# Patient Record
Sex: FEMALE | Race: OTHER | Employment: OTHER | URBAN - METROPOLITAN AREA
[De-identification: names, ages, dates, MRNs, and addresses within clinical notes are randomized per-mention and may not be internally consistent; named-entity substitution may affect disease eponyms.]

---

## 2018-07-19 ENCOUNTER — NEW PATIENT COMPREHENSIVE (OUTPATIENT)
Dept: URBAN - METROPOLITAN AREA CLINIC 47 | Facility: CLINIC | Age: 53
End: 2018-07-19

## 2018-07-19 DIAGNOSIS — H52.13: ICD-10-CM

## 2018-07-19 DIAGNOSIS — H52.203: ICD-10-CM

## 2018-07-19 DIAGNOSIS — H52.4: ICD-10-CM

## 2018-07-19 PROCEDURE — 92310PDW PREMIUM SPECIALTY DAILY WEAR

## 2018-07-19 PROCEDURE — 92310-2 LEVEL 2 CONTACT LENS MANAGEMENT

## 2018-07-19 PROCEDURE — 92004 COMPRE OPH EXAM NEW PT 1/>: CPT

## 2018-07-19 PROCEDURE — 92015 DETERMINE REFRACTIVE STATE: CPT

## 2018-07-19 ASSESSMENT — KERATOMETRY
OS_AXISANGLE2_DEGREES: 125
OS_K2POWER_DIOPTERS: 47.37
OS_AXISANGLE_DEGREES: 35
OD_AXISANGLE2_DEGREES: 60
OD_K1POWER_DIOPTERS: 46.12
OD_K2POWER_DIOPTERS: 47.50
OS_K1POWER_DIOPTERS: 46.62
OD_AXISANGLE_DEGREES: 150

## 2018-07-19 ASSESSMENT — VISUAL ACUITY
OU_CC: J1
OD_SC: 20/400
OS_SC: 20/200
OU_CC: 20/30
OD_SC: J3
OS_SC: J1

## 2018-07-19 ASSESSMENT — TONOMETRY
OD_IOP_MMHG: 15
OS_IOP_MMHG: 15

## 2018-09-13 ENCOUNTER — IOP CHECK (OUTPATIENT)
Dept: URBAN - METROPOLITAN AREA CLINIC 47 | Facility: CLINIC | Age: 53
End: 2018-09-13

## 2018-09-13 DIAGNOSIS — H40.013: ICD-10-CM

## 2018-09-13 PROCEDURE — 99213 OFFICE O/P EST LOW 20 MIN: CPT

## 2018-09-13 PROCEDURE — 92083 EXTENDED VISUAL FIELD XM: CPT

## 2018-09-13 ASSESSMENT — KERATOMETRY
OS_K1POWER_DIOPTERS: 46.62
OS_AXISANGLE2_DEGREES: 125
OD_K2POWER_DIOPTERS: 47.50
OD_AXISANGLE2_DEGREES: 60
OS_K2POWER_DIOPTERS: 47.37
OS_AXISANGLE_DEGREES: 35
OD_K1POWER_DIOPTERS: 46.12
OD_AXISANGLE_DEGREES: 150

## 2018-09-13 ASSESSMENT — VISUAL ACUITY
OD_SC: J1
OS_SC: 20/200
OD_SC: 20/400
OS_SC: J1

## 2018-09-13 ASSESSMENT — TONOMETRY
OS_IOP_MMHG: 14
OD_IOP_MMHG: 16

## 2019-08-06 ENCOUNTER — CONTACT LENS EXAM ESTABLISHED (OUTPATIENT)
Dept: URBAN - METROPOLITAN AREA CLINIC 47 | Facility: CLINIC | Age: 54
End: 2019-08-06

## 2019-08-06 DIAGNOSIS — H25.13: ICD-10-CM

## 2019-08-06 DIAGNOSIS — H52.4: ICD-10-CM

## 2019-08-06 DIAGNOSIS — H52.203: ICD-10-CM

## 2019-08-06 DIAGNOSIS — H52.13: ICD-10-CM

## 2019-08-06 PROCEDURE — 92015 DETERMINE REFRACTIVE STATE: CPT

## 2019-08-06 PROCEDURE — 92310-1 LEVEL 1 CONTACT LENS MANAGEMENT

## 2019-08-06 PROCEDURE — 92014 COMPRE OPH EXAM EST PT 1/>: CPT

## 2019-08-06 ASSESSMENT — TONOMETRY
OS_IOP_MMHG: 14
OD_IOP_MMHG: 15

## 2019-08-06 ASSESSMENT — KERATOMETRY
OS_K2POWER_DIOPTERS: 47.37
OS_AXISANGLE_DEGREES: 35
OD_AXISANGLE_DEGREES: 150
OD_AXISANGLE2_DEGREES: 60
OS_AXISANGLE2_DEGREES: 125
OD_K2POWER_DIOPTERS: 47.50
OS_K1POWER_DIOPTERS: 46.62
OD_K1POWER_DIOPTERS: 46.12

## 2019-08-06 ASSESSMENT — VISUAL ACUITY
OD_SC: J1
OD_SC: 20/400
OS_SC: 20/200
OS_CC: 20/20
OS_SC: J1
OD_CC: 20/25+2

## 2021-01-25 ENCOUNTER — ESTABLISHED COMPREHENSIVE EXAM (OUTPATIENT)
Dept: URBAN - METROPOLITAN AREA CLINIC 47 | Facility: CLINIC | Age: 56
End: 2021-01-25

## 2021-01-25 DIAGNOSIS — H04.123: ICD-10-CM

## 2021-01-25 DIAGNOSIS — H40.013: ICD-10-CM

## 2021-01-25 DIAGNOSIS — H52.13: ICD-10-CM

## 2021-01-25 DIAGNOSIS — H52.4: ICD-10-CM

## 2021-01-25 DIAGNOSIS — H52.203: ICD-10-CM

## 2021-01-25 PROCEDURE — 92014 COMPRE OPH EXAM EST PT 1/>: CPT

## 2021-01-25 PROCEDURE — 92015 DETERMINE REFRACTIVE STATE: CPT

## 2021-01-25 PROCEDURE — 92310-2 LEVEL 2 CONTACT LENS MANAGEMENT

## 2021-01-25 ASSESSMENT — KERATOMETRY
OS_K1POWER_DIOPTERS: 46.62
OD_AXISANGLE_DEGREES: 150
OD_K2POWER_DIOPTERS: 47.50
OS_AXISANGLE2_DEGREES: 125
OD_AXISANGLE2_DEGREES: 60
OD_K1POWER_DIOPTERS: 46.12
OS_K2POWER_DIOPTERS: 47.37
OS_AXISANGLE_DEGREES: 35

## 2021-01-25 ASSESSMENT — VISUAL ACUITY
OS_CC: J1 W/ CL
OD_CC: 20/40 W/ CL
OS_SC: 20/100-1
OD_SC: 20/400
OS_SC: J1
OD_SC: J1

## 2021-01-25 ASSESSMENT — TONOMETRY
OS_IOP_MMHG: 13
OD_IOP_MMHG: 11

## 2021-02-08 ENCOUNTER — TECH ONLY (OUTPATIENT)
Dept: URBAN - METROPOLITAN AREA CLINIC 43 | Facility: CLINIC | Age: 56
End: 2021-02-08

## 2021-02-08 DIAGNOSIS — H40.023: ICD-10-CM

## 2021-02-08 PROCEDURE — 99211T TECH SERVICE

## 2021-02-08 PROCEDURE — 92083 EXTENDED VISUAL FIELD XM: CPT

## 2021-02-08 ASSESSMENT — KERATOMETRY
OD_K2POWER_DIOPTERS: 47.50
OS_K2POWER_DIOPTERS: 47.37
OD_K1POWER_DIOPTERS: 46.12
OD_AXISANGLE2_DEGREES: 60
OS_AXISANGLE2_DEGREES: 125
OS_K1POWER_DIOPTERS: 46.62
OS_AXISANGLE_DEGREES: 35
OD_AXISANGLE_DEGREES: 150

## 2021-03-08 ENCOUNTER — IOP CHECK (OUTPATIENT)
Dept: URBAN - METROPOLITAN AREA CLINIC 47 | Facility: CLINIC | Age: 56
End: 2021-03-08

## 2021-03-08 DIAGNOSIS — H04.123: ICD-10-CM

## 2021-03-08 DIAGNOSIS — H40.053: ICD-10-CM

## 2021-03-08 DIAGNOSIS — H40.023: ICD-10-CM

## 2021-03-08 PROCEDURE — 92012 INTRM OPH EXAM EST PATIENT: CPT

## 2021-03-08 PROCEDURE — 76514 ECHO EXAM OF EYE THICKNESS: CPT

## 2021-03-08 PROCEDURE — 92133 CPTRZD OPH DX IMG PST SGM ON: CPT

## 2021-03-08 ASSESSMENT — KERATOMETRY
OS_K2POWER_DIOPTERS: 47.37
OD_K1POWER_DIOPTERS: 46.12
OD_K2POWER_DIOPTERS: 47.50
OD_AXISANGLE_DEGREES: 150
OS_K1POWER_DIOPTERS: 46.62
OD_AXISANGLE2_DEGREES: 60
OS_AXISANGLE2_DEGREES: 125
OS_AXISANGLE_DEGREES: 35

## 2021-03-08 ASSESSMENT — TONOMETRY
OS_IOP_MMHG: 16
OD_IOP_MMHG: 20
OD_IOP_MMHG: 23
OS_IOP_MMHG: 14

## 2021-03-08 ASSESSMENT — VISUAL ACUITY
OD_CC: 20/20-2
OS_CC: 20/70+2

## 2021-06-07 ENCOUNTER — FOLLOW UP (OUTPATIENT)
Dept: URBAN - METROPOLITAN AREA CLINIC 43 | Facility: CLINIC | Age: 56
End: 2021-06-07

## 2021-06-07 DIAGNOSIS — H04.123: ICD-10-CM

## 2021-06-07 DIAGNOSIS — H40.053: ICD-10-CM

## 2021-06-07 DIAGNOSIS — H40.023: ICD-10-CM

## 2021-06-07 PROCEDURE — 92012 INTRM OPH EXAM EST PATIENT: CPT

## 2021-06-07 ASSESSMENT — KERATOMETRY
OS_AXISANGLE2_DEGREES: 125
OS_K1POWER_DIOPTERS: 46.62
OS_AXISANGLE_DEGREES: 35
OS_K2POWER_DIOPTERS: 47.37
OD_AXISANGLE_DEGREES: 150
OD_K1POWER_DIOPTERS: 46.12
OD_K2POWER_DIOPTERS: 47.50
OD_AXISANGLE2_DEGREES: 60

## 2021-06-07 ASSESSMENT — VISUAL ACUITY
OS_CC: 20/70
OD_CC: 20/20-1

## 2021-06-07 ASSESSMENT — TONOMETRY
OD_IOP_MMHG: 20
OS_IOP_MMHG: 17

## 2021-08-09 ENCOUNTER — TECH ONLY (OUTPATIENT)
Dept: URBAN - METROPOLITAN AREA CLINIC 43 | Facility: CLINIC | Age: 56
End: 2021-08-09

## 2021-08-09 DIAGNOSIS — H40.053: ICD-10-CM

## 2021-08-09 DIAGNOSIS — H40.023: ICD-10-CM

## 2021-08-09 PROCEDURE — 99211T TECH SERVICE

## 2021-08-09 PROCEDURE — 92083 EXTENDED VISUAL FIELD XM: CPT

## 2021-08-09 ASSESSMENT — KERATOMETRY
OD_AXISANGLE2_DEGREES: 60
OD_K1POWER_DIOPTERS: 46.12
OS_K1POWER_DIOPTERS: 46.62
OD_K2POWER_DIOPTERS: 47.50
OS_K2POWER_DIOPTERS: 47.37
OS_AXISANGLE_DEGREES: 35
OS_AXISANGLE2_DEGREES: 125
OD_AXISANGLE_DEGREES: 150

## 2021-10-04 ENCOUNTER — IOP CHECK (OUTPATIENT)
Dept: URBAN - METROPOLITAN AREA CLINIC 47 | Facility: CLINIC | Age: 56
End: 2021-10-04

## 2021-10-04 DIAGNOSIS — H40.023: ICD-10-CM

## 2021-10-04 DIAGNOSIS — H25.13: ICD-10-CM

## 2021-10-04 DIAGNOSIS — H40.053: ICD-10-CM

## 2021-10-04 DIAGNOSIS — H04.123: ICD-10-CM

## 2021-10-04 PROCEDURE — 92250 FUNDUS PHOTOGRAPHY W/I&R: CPT

## 2021-10-04 PROCEDURE — 92012 INTRM OPH EXAM EST PATIENT: CPT

## 2021-10-04 ASSESSMENT — VISUAL ACUITY
OU_CC: 20/20 CL
OD_CC: 20/25 CL

## 2021-10-04 ASSESSMENT — KERATOMETRY
OD_K1POWER_DIOPTERS: 46.12
OS_AXISANGLE_DEGREES: 35
OS_K1POWER_DIOPTERS: 46.62
OD_AXISANGLE_DEGREES: 150
OD_K2POWER_DIOPTERS: 47.50
OS_K2POWER_DIOPTERS: 47.37
OD_AXISANGLE2_DEGREES: 60
OS_AXISANGLE2_DEGREES: 125

## 2021-10-04 ASSESSMENT — TONOMETRY
OD_IOP_MMHG: 16
OS_IOP_MMHG: 13

## 2022-01-24 ASSESSMENT — KERATOMETRY
OS_K1POWER_DIOPTERS: 46.62
OD_AXISANGLE2_DEGREES: 60
OS_AXISANGLE2_DEGREES: 125
OS_K2POWER_DIOPTERS: 47.37
OD_K1POWER_DIOPTERS: 46.12
OS_AXISANGLE_DEGREES: 35
OD_AXISANGLE_DEGREES: 150
OD_K2POWER_DIOPTERS: 47.50

## 2022-01-26 ENCOUNTER — COMPREHENSIVE EXAM (OUTPATIENT)
Dept: URBAN - METROPOLITAN AREA CLINIC 47 | Facility: CLINIC | Age: 57
End: 2022-01-26

## 2022-01-26 DIAGNOSIS — H40.053: ICD-10-CM

## 2022-01-26 DIAGNOSIS — H25.13: ICD-10-CM

## 2022-01-26 DIAGNOSIS — H52.203: ICD-10-CM

## 2022-01-26 DIAGNOSIS — Z97.3: ICD-10-CM

## 2022-01-26 DIAGNOSIS — H40.023: ICD-10-CM

## 2022-01-26 DIAGNOSIS — H52.13: ICD-10-CM

## 2022-01-26 DIAGNOSIS — H04.123: ICD-10-CM

## 2022-01-26 DIAGNOSIS — H52.4: ICD-10-CM

## 2022-01-26 PROCEDURE — 92014 COMPRE OPH EXAM EST PT 1/>: CPT

## 2022-01-26 PROCEDURE — 92133 CPTRZD OPH DX IMG PST SGM ON: CPT

## 2022-01-26 PROCEDURE — 92015 DETERMINE REFRACTIVE STATE: CPT

## 2022-01-26 PROCEDURE — 92310-1 LEVEL 1 CONTACT LENS MANAGEMENT

## 2022-01-26 ASSESSMENT — VISUAL ACUITY
OD_CC: 20/20
OS_CC: J1-
OS_SC: 20/70-1
OD_CC: J8
OD_SC: 20/150
OS_SC: J1
OS_CC: 20/40
OD_SC: J1

## 2022-01-26 ASSESSMENT — TONOMETRY
OS_IOP_MMHG: 14
OD_IOP_MMHG: 16

## 2022-12-12 ENCOUNTER — APPOINTMENT (RX ONLY)
Dept: URBAN - METROPOLITAN AREA CLINIC 137 | Facility: CLINIC | Age: 57
Setting detail: DERMATOLOGY
End: 2022-12-12

## 2022-12-12 DIAGNOSIS — L82.1 OTHER SEBORRHEIC KERATOSIS: ICD-10-CM | Status: UNCHANGED

## 2022-12-12 DIAGNOSIS — Z71.89 OTHER SPECIFIED COUNSELING: ICD-10-CM

## 2022-12-12 DIAGNOSIS — L57.8 OTHER SKIN CHANGES DUE TO CHRONIC EXPOSURE TO NONIONIZING RADIATION: ICD-10-CM | Status: UNCHANGED

## 2022-12-12 DIAGNOSIS — Z80.8 FAMILY HISTORY OF MALIGNANT NEOPLASM OF OTHER ORGANS OR SYSTEMS: ICD-10-CM

## 2022-12-12 DIAGNOSIS — D18.0 HEMANGIOMA: ICD-10-CM | Status: UNCHANGED

## 2022-12-12 PROBLEM — D18.01 HEMANGIOMA OF SKIN AND SUBCUTANEOUS TISSUE: Status: ACTIVE | Noted: 2022-12-12

## 2022-12-12 PROCEDURE — ? POINTED OUT BY PATIENT

## 2022-12-12 PROCEDURE — 99213 OFFICE O/P EST LOW 20 MIN: CPT

## 2022-12-12 PROCEDURE — ? COUNSELING

## 2022-12-12 PROCEDURE — ? SUNSCREEN RECOMMENDATIONS

## 2022-12-12 ASSESSMENT — LOCATION DETAILED DESCRIPTION DERM
LOCATION DETAILED: RIGHT POSTERIOR SHOULDER
LOCATION DETAILED: LEFT MEDIAL SUPERIOR CHEST
LOCATION DETAILED: PERIUMBILICAL SKIN
LOCATION DETAILED: INFERIOR THORACIC SPINE

## 2022-12-12 ASSESSMENT — LOCATION SIMPLE DESCRIPTION DERM
LOCATION SIMPLE: RIGHT SHOULDER
LOCATION SIMPLE: UPPER BACK
LOCATION SIMPLE: ABDOMEN
LOCATION SIMPLE: CHEST

## 2022-12-12 ASSESSMENT — LOCATION ZONE DERM
LOCATION ZONE: TRUNK
LOCATION ZONE: ARM

## 2023-01-31 ENCOUNTER — COMPREHENSIVE EXAM (OUTPATIENT)
Dept: URBAN - METROPOLITAN AREA CLINIC 47 | Facility: CLINIC | Age: 58
End: 2023-01-31

## 2023-01-31 DIAGNOSIS — H52.4: ICD-10-CM

## 2023-01-31 DIAGNOSIS — H52.13: ICD-10-CM

## 2023-01-31 DIAGNOSIS — H52.7: ICD-10-CM

## 2023-01-31 DIAGNOSIS — H25.13: ICD-10-CM

## 2023-01-31 DIAGNOSIS — H40.023: ICD-10-CM

## 2023-01-31 DIAGNOSIS — H02.88B: ICD-10-CM

## 2023-01-31 DIAGNOSIS — H02.88A: ICD-10-CM

## 2023-01-31 DIAGNOSIS — H52.203: ICD-10-CM

## 2023-01-31 DIAGNOSIS — Z97.3: ICD-10-CM

## 2023-01-31 DIAGNOSIS — H04.123: ICD-10-CM

## 2023-01-31 PROCEDURE — 92014 COMPRE OPH EXAM EST PT 1/>: CPT

## 2023-01-31 PROCEDURE — 92310-1 LEVEL 1 CONTACT LENS MANAGEMENT

## 2023-01-31 PROCEDURE — 92015 DETERMINE REFRACTIVE STATE: CPT

## 2023-01-31 ASSESSMENT — VISUAL ACUITY
OS_OTHER: J1 MONOVISION
OS_SC: 20/100
OD_SC: 20/200+1
OD_OTHER: 20/20 MONOVISION
OS_CC: 20/25
OD_CC: 20/20
OD_SC: J1
OU_CC: 20/20
OU_SC: 20/80
OU_SC: J1
OS_SC: J1

## 2023-01-31 ASSESSMENT — KERATOMETRY
OS_AXISANGLE2_DEGREES: 125
OD_AXISANGLE_DEGREES: 150
OD_K2POWER_DIOPTERS: 47.50
OD_AXISANGLE2_DEGREES: 60
OS_K1POWER_DIOPTERS: 46.62
OS_K2POWER_DIOPTERS: 47.37
OD_K1POWER_DIOPTERS: 46.12
OS_AXISANGLE_DEGREES: 35

## 2023-01-31 ASSESSMENT — TONOMETRY
OS_IOP_MMHG: 16
OD_IOP_MMHG: 16

## 2023-12-13 ENCOUNTER — APPOINTMENT (RX ONLY)
Dept: URBAN - METROPOLITAN AREA CLINIC 137 | Facility: CLINIC | Age: 58
Setting detail: DERMATOLOGY
End: 2023-12-13

## 2023-12-13 DIAGNOSIS — S0032XA BLISTER OF FACE, NECK, AND SCALP EXCEPT EYE, WITHOUT MENTION OF INFECTION: ICD-10-CM

## 2023-12-13 DIAGNOSIS — Z80.8 FAMILY HISTORY OF MALIGNANT NEOPLASM OF OTHER ORGANS OR SYSTEMS: ICD-10-CM

## 2023-12-13 DIAGNOSIS — S00522A BLISTER OF FACE, NECK, AND SCALP EXCEPT EYE, WITHOUT MENTION OF INFECTION: ICD-10-CM

## 2023-12-13 DIAGNOSIS — L57.8 OTHER SKIN CHANGES DUE TO CHRONIC EXPOSURE TO NONIONIZING RADIATION: ICD-10-CM | Status: UNCHANGED

## 2023-12-13 DIAGNOSIS — S0092XA BLISTER OF FACE, NECK, AND SCALP EXCEPT EYE, WITHOUT MENTION OF INFECTION: ICD-10-CM

## 2023-12-13 DIAGNOSIS — L57.0 ACTINIC KERATOSIS: ICD-10-CM

## 2023-12-13 DIAGNOSIS — L82.1 OTHER SEBORRHEIC KERATOSIS: ICD-10-CM | Status: UNCHANGED

## 2023-12-13 DIAGNOSIS — S00429A BLISTER OF FACE, NECK, AND SCALP EXCEPT EYE, WITHOUT MENTION OF INFECTION: ICD-10-CM

## 2023-12-13 DIAGNOSIS — Z71.89 OTHER SPECIFIED COUNSELING: ICD-10-CM

## 2023-12-13 DIAGNOSIS — S1012XA BLISTER OF FACE, NECK, AND SCALP EXCEPT EYE, WITHOUT MENTION OF INFECTION: ICD-10-CM

## 2023-12-13 DIAGNOSIS — S00521A BLISTER OF FACE, NECK, AND SCALP EXCEPT EYE, WITHOUT MENTION OF INFECTION: ICD-10-CM

## 2023-12-13 DIAGNOSIS — S1092XA BLISTER OF FACE, NECK, AND SCALP EXCEPT EYE, WITHOUT MENTION OF INFECTION: ICD-10-CM

## 2023-12-13 DIAGNOSIS — D18.0 HEMANGIOMA: ICD-10-CM | Status: UNCHANGED

## 2023-12-13 DIAGNOSIS — S0002XA BLISTER OF FACE, NECK, AND SCALP EXCEPT EYE, WITHOUT MENTION OF INFECTION: ICD-10-CM

## 2023-12-13 PROBLEM — S90.522A BLISTER (NONTHERMAL), LEFT ANKLE, INITIAL ENCOUNTER: Status: ACTIVE | Noted: 2023-12-13

## 2023-12-13 PROBLEM — D18.01 HEMANGIOMA OF SKIN AND SUBCUTANEOUS TISSUE: Status: ACTIVE | Noted: 2023-12-13

## 2023-12-13 PROCEDURE — ? SUNSCREEN RECOMMENDATIONS

## 2023-12-13 PROCEDURE — 99213 OFFICE O/P EST LOW 20 MIN: CPT | Mod: 25

## 2023-12-13 PROCEDURE — ? LIQUID NITROGEN

## 2023-12-13 PROCEDURE — 17000 DESTRUCT PREMALG LESION: CPT

## 2023-12-13 PROCEDURE — ? COUNSELING

## 2023-12-13 ASSESSMENT — LOCATION SIMPLE DESCRIPTION DERM
LOCATION SIMPLE: UPPER BACK
LOCATION SIMPLE: ABDOMEN
LOCATION SIMPLE: CHEST
LOCATION SIMPLE: RIGHT UPPER BACK
LOCATION SIMPLE: LEFT ANKLE

## 2023-12-13 ASSESSMENT — LOCATION DETAILED DESCRIPTION DERM
LOCATION DETAILED: LEFT MEDIAL ACHILLES SKIN
LOCATION DETAILED: MIDDLE STERNUM
LOCATION DETAILED: INFERIOR THORACIC SPINE
LOCATION DETAILED: PERIUMBILICAL SKIN
LOCATION DETAILED: RIGHT SUPERIOR UPPER BACK
LOCATION DETAILED: LEFT MEDIAL SUPERIOR CHEST

## 2023-12-13 ASSESSMENT — LOCATION ZONE DERM
LOCATION ZONE: TRUNK
LOCATION ZONE: LEG

## 2024-11-07 ENCOUNTER — COMPREHENSIVE EXAM (OUTPATIENT)
Dept: URBAN - METROPOLITAN AREA CLINIC 47 | Facility: CLINIC | Age: 59
End: 2024-11-07

## 2024-11-07 DIAGNOSIS — H04.123: ICD-10-CM

## 2024-11-07 DIAGNOSIS — Z46.0: ICD-10-CM

## 2024-11-07 DIAGNOSIS — H02.88B: ICD-10-CM

## 2024-11-07 DIAGNOSIS — H25.13: ICD-10-CM

## 2024-11-07 DIAGNOSIS — H40.023: ICD-10-CM

## 2024-11-07 DIAGNOSIS — H02.88A: ICD-10-CM

## 2024-11-07 PROCEDURE — 99214 OFFICE O/P EST MOD 30 MIN: CPT

## 2024-11-07 PROCEDURE — 92310-1 LEVEL 1 SOFT LENS UPDATE

## 2024-11-07 PROCEDURE — 92015 DETERMINE REFRACTIVE STATE: CPT

## 2024-11-07 PROCEDURE — 92133 CPTRZD OPH DX IMG PST SGM ON: CPT

## 2024-12-16 ENCOUNTER — APPOINTMENT (OUTPATIENT)
Dept: URBAN - METROPOLITAN AREA CLINIC 137 | Facility: CLINIC | Age: 59
Setting detail: DERMATOLOGY
End: 2024-12-16

## 2024-12-16 DIAGNOSIS — D18.0 HEMANGIOMA: ICD-10-CM | Status: UNCHANGED

## 2024-12-16 DIAGNOSIS — L57.8 OTHER SKIN CHANGES DUE TO CHRONIC EXPOSURE TO NONIONIZING RADIATION: ICD-10-CM | Status: UNCHANGED

## 2024-12-16 DIAGNOSIS — K13.0 DISEASES OF LIPS: ICD-10-CM

## 2024-12-16 DIAGNOSIS — L82.1 OTHER SEBORRHEIC KERATOSIS: ICD-10-CM | Status: UNCHANGED

## 2024-12-16 DIAGNOSIS — Z71.89 OTHER SPECIFIED COUNSELING: ICD-10-CM

## 2024-12-16 DIAGNOSIS — L57.0 ACTINIC KERATOSIS: ICD-10-CM

## 2024-12-16 PROBLEM — D18.01 HEMANGIOMA OF SKIN AND SUBCUTANEOUS TISSUE: Status: ACTIVE | Noted: 2024-12-16

## 2024-12-16 PROCEDURE — ? LIQUID NITROGEN

## 2024-12-16 PROCEDURE — ? SUNSCREEN RECOMMENDATIONS

## 2024-12-16 PROCEDURE — 99213 OFFICE O/P EST LOW 20 MIN: CPT | Mod: 25

## 2024-12-16 PROCEDURE — ? PRESCRIPTION MEDICATION MANAGEMENT

## 2024-12-16 PROCEDURE — ? PRESCRIPTION

## 2024-12-16 PROCEDURE — ? COUNSELING

## 2024-12-16 PROCEDURE — 17000 DESTRUCT PREMALG LESION: CPT

## 2024-12-16 RX ORDER — KETOCONAZOLE 20 MG/G
CREAM TOPICAL BID
Qty: 30 | Refills: 3 | Status: ERX | COMMUNITY
Start: 2024-12-16

## 2024-12-16 RX ADMIN — KETOCONAZOLE: 20 CREAM TOPICAL at 00:00

## 2024-12-16 ASSESSMENT — LOCATION ZONE DERM
LOCATION ZONE: LIP
LOCATION ZONE: TRUNK
LOCATION ZONE: HAND

## 2024-12-16 ASSESSMENT — LOCATION DETAILED DESCRIPTION DERM
LOCATION DETAILED: RIGHT SUPERIOR VERMILION LIP
LOCATION DETAILED: LEFT MEDIAL SUPERIOR CHEST
LOCATION DETAILED: RIGHT SUPERIOR UPPER BACK
LOCATION DETAILED: INFERIOR THORACIC SPINE
LOCATION DETAILED: LEFT DORSAL MIDDLE METACARPOPHALANGEAL JOINT
LOCATION DETAILED: PERIUMBILICAL SKIN
LOCATION DETAILED: LEFT SUPERIOR VERMILION LIP

## 2024-12-16 ASSESSMENT — LOCATION SIMPLE DESCRIPTION DERM
LOCATION SIMPLE: RIGHT LIP
LOCATION SIMPLE: CHEST
LOCATION SIMPLE: ABDOMEN
LOCATION SIMPLE: UPPER BACK
LOCATION SIMPLE: LEFT HAND
LOCATION SIMPLE: RIGHT UPPER BACK
LOCATION SIMPLE: LEFT LIP

## 2024-12-16 NOTE — PROCEDURE: PRESCRIPTION MEDICATION MANAGEMENT
Initiate Treatment: Ketoconazole cream Apply once daily to rash on corners of mouth
Detail Level: Zone
Render In Strict Bullet Format?: No

## 2025-04-11 ENCOUNTER — ADDENDUM (OUTPATIENT)
Age: 60
End: 2025-04-11